# Patient Record
Sex: FEMALE | Race: BLACK OR AFRICAN AMERICAN | HISPANIC OR LATINO | Employment: FULL TIME | URBAN - METROPOLITAN AREA
[De-identification: names, ages, dates, MRNs, and addresses within clinical notes are randomized per-mention and may not be internally consistent; named-entity substitution may affect disease eponyms.]

---

## 2017-07-05 ENCOUNTER — HOSPITAL ENCOUNTER (EMERGENCY)
Facility: HOSPITAL | Age: 22
Discharge: HOME | End: 2017-07-05
Attending: EMERGENCY MEDICINE
Payer: COMMERCIAL

## 2017-07-05 VITALS
SYSTOLIC BLOOD PRESSURE: 140 MMHG | HEART RATE: 98 BPM | DIASTOLIC BLOOD PRESSURE: 82 MMHG | RESPIRATION RATE: 16 BRPM | HEIGHT: 65 IN | OXYGEN SATURATION: 100 % | TEMPERATURE: 98 F | WEIGHT: 150 LBS | BODY MASS INDEX: 24.99 KG/M2

## 2017-07-05 DIAGNOSIS — R50.9 ACUTE FEBRILE ILLNESS: Primary | ICD-10-CM

## 2017-07-05 LAB
ALBUMIN SERPL BCP-MCNC: 2.7 G/DL
ALP SERPL-CCNC: 123 U/L
ALT SERPL W/O P-5'-P-CCNC: 33 U/L
ANION GAP SERPL CALC-SCNC: 11 MMOL/L
AST SERPL-CCNC: 17 U/L
BACTERIA #/AREA URNS HPF: ABNORMAL /HPF
BASOPHILS # BLD AUTO: 0.01 K/UL
BASOPHILS NFR BLD: 0.1 %
BILIRUB SERPL-MCNC: 0.6 MG/DL
BILIRUB UR QL STRIP: NEGATIVE
BUN SERPL-MCNC: 16 MG/DL
CALCIUM SERPL-MCNC: 8.4 MG/DL
CHLORIDE SERPL-SCNC: 104 MMOL/L
CLARITY UR: CLEAR
CO2 SERPL-SCNC: 20 MMOL/L
COLOR UR: YELLOW
CREAT SERPL-MCNC: 0.9 MG/DL
DIFFERENTIAL METHOD: ABNORMAL
EOSINOPHIL # BLD AUTO: 0 K/UL
EOSINOPHIL NFR BLD: 0.1 %
ERYTHROCYTE [DISTWIDTH] IN BLOOD BY AUTOMATED COUNT: 13.8 %
EST. GFR  (AFRICAN AMERICAN): >60 ML/MIN/1.73 M^2
EST. GFR  (NON AFRICAN AMERICAN): >60 ML/MIN/1.73 M^2
GLUCOSE SERPL-MCNC: 90 MG/DL
GLUCOSE UR QL STRIP: NEGATIVE
HCG INTACT+B SERPL-ACNC: 21 MIU/ML
HCT VFR BLD AUTO: 34.5 %
HGB BLD-MCNC: 11.7 G/DL
HGB UR QL STRIP: ABNORMAL
HYALINE CASTS #/AREA URNS LPF: 0 /LPF
KETONES UR QL STRIP: ABNORMAL
LACTATE SERPL-SCNC: 0.6 MMOL/L
LEUKOCYTE ESTERASE UR QL STRIP: ABNORMAL
LYMPHOCYTES # BLD AUTO: 0.9 K/UL
LYMPHOCYTES NFR BLD: 8.1 %
MCH RBC QN AUTO: 30.2 PG
MCHC RBC AUTO-ENTMCNC: 33.9 %
MCV RBC AUTO: 89 FL
MICROSCOPIC COMMENT: ABNORMAL
MONOCYTES # BLD AUTO: 0.9 K/UL
MONOCYTES NFR BLD: 7.6 %
NEUTROPHILS # BLD AUTO: 9.8 K/UL
NEUTROPHILS NFR BLD: 84.1 %
NITRITE UR QL STRIP: NEGATIVE
NON-SQ EPI CELLS #/AREA URNS HPF: 3 /HPF
PH UR STRIP: 7 [PH] (ref 5–8)
PLATELET # BLD AUTO: 180 K/UL
PMV BLD AUTO: 11.7 FL
POTASSIUM SERPL-SCNC: 3.5 MMOL/L
PROT SERPL-MCNC: 6.7 G/DL
PROT UR QL STRIP: ABNORMAL
RBC # BLD AUTO: 3.88 M/UL
RBC #/AREA URNS HPF: 25 /HPF (ref 0–4)
SODIUM SERPL-SCNC: 135 MMOL/L
SP GR UR STRIP: 1.01 (ref 1–1.03)
SQUAMOUS #/AREA URNS HPF: 4 /HPF
URN SPEC COLLECT METH UR: ABNORMAL
UROBILINOGEN UR STRIP-ACNC: NEGATIVE EU/DL
WBC # BLD AUTO: 11.62 K/UL
WBC #/AREA URNS HPF: 4 /HPF (ref 0–5)

## 2017-07-05 PROCEDURE — 83605 ASSAY OF LACTIC ACID: CPT

## 2017-07-05 PROCEDURE — 81000 URINALYSIS NONAUTO W/SCOPE: CPT

## 2017-07-05 PROCEDURE — 96360 HYDRATION IV INFUSION INIT: CPT

## 2017-07-05 PROCEDURE — 84702 CHORIONIC GONADOTROPIN TEST: CPT

## 2017-07-05 PROCEDURE — 99284 EMERGENCY DEPT VISIT MOD MDM: CPT | Mod: 25

## 2017-07-05 PROCEDURE — 85025 COMPLETE CBC W/AUTO DIFF WBC: CPT

## 2017-07-05 PROCEDURE — 87040 BLOOD CULTURE FOR BACTERIA: CPT | Mod: 59

## 2017-07-05 PROCEDURE — 25000003 PHARM REV CODE 250: Performed by: EMERGENCY MEDICINE

## 2017-07-05 PROCEDURE — 96361 HYDRATE IV INFUSION ADD-ON: CPT

## 2017-07-05 PROCEDURE — 80053 COMPREHEN METABOLIC PANEL: CPT

## 2017-07-05 RX ORDER — SODIUM CHLORIDE 9 MG/ML
1000 INJECTION, SOLUTION INTRAVENOUS
Status: COMPLETED | OUTPATIENT
Start: 2017-07-05 | End: 2017-07-05

## 2017-07-05 RX ORDER — SODIUM CHLORIDE 9 MG/ML
500 INJECTION, SOLUTION INTRAVENOUS
Status: COMPLETED | OUTPATIENT
Start: 2017-07-05 | End: 2017-07-05

## 2017-07-05 RX ORDER — ACETAMINOPHEN 500 MG
1000 TABLET ORAL
Status: DISCONTINUED | OUTPATIENT
Start: 2017-07-05 | End: 2017-07-05

## 2017-07-05 RX ADMIN — SODIUM CHLORIDE 500 ML: 0.9 INJECTION, SOLUTION INTRAVENOUS at 07:07

## 2017-07-05 RX ADMIN — SODIUM CHLORIDE 1000 ML: 0.9 INJECTION, SOLUTION INTRAVENOUS at 05:07

## 2017-07-05 NOTE — ED TRIAGE NOTES
"Pt arrived via personal transportation from home. CC of abdominal pain. Pt stated "I have been having a fever since last night. I went to the urgent care and  They told me to come here." pt reports with generalized abdominal pain and fever. Pt also reports recent pregnancy. Pt denies N/V/D/SOB. NAD at this time.  "

## 2017-07-05 NOTE — ED PROVIDER NOTES
"Encounter Date: 7/5/2017    SCRIBE #1 NOTE: I, Kilojhonny Suazo II, am scribing for, and in the presence of,  Jose Pinto III, MD. I have scribed the following portions of the note - Other sections scribed: HPI and ROS.       History     Chief Complaint   Patient presents with    Abdominal Pain     " I gave birth 8 days ago (vaginal delivery) and last night I started having fever. I also have been having upper right abdominal pain."  Tylenol at 1600 for temp of 103.1      CC: Abdominal Pain     HPI: This 22 y.o. female with astham presents to the ED c/o acute onset, intermittent, upper right quadrant abdominal pain with fever that began today. Pt reports delivering a child via vaginal delivery 8 days ago. Pt states she experiences sharp shooting abdominal pain that lasts for 3 seconds intermittently. Pt states she has been experiencing decreased appetite today. Pt reports Tylenol given by the ED has made her feel better. Pt reports her OB/GYN is Dr. Sheridan Rich at [facility]. No alleviating or exacerbating factors. Pt denies nausea, vomiting, and dysuria.               The history is provided by the patient. No  was used.     Review of patient's allergies indicates:   Allergen Reactions    Fish containing products Hives    Eggs [egg derived] Hives    Latex, natural rubber Hives     Past Medical History:   Diagnosis Date    Asthma      History reviewed. No pertinent surgical history.  History reviewed. No pertinent family history.  Social History   Substance Use Topics    Smoking status: Never Smoker    Smokeless tobacco: Never Used    Alcohol use No     Review of Systems   Constitutional: Positive for appetite change (decreased) and fever (103 F). Negative for chills and diaphoresis.   HENT: Negative for ear pain and sore throat.    Eyes: Negative for pain.        (-) eye problems   Respiratory: Negative for cough and shortness of breath.    Cardiovascular: Negative for chest pain. "   Gastrointestinal: Positive for abdominal pain. Negative for diarrhea, nausea and vomiting.   Genitourinary: Negative for dysuria.   Musculoskeletal: Negative for back pain.        (-) arm or leg problems   Skin: Negative for rash.   Neurological: Negative for headaches.       Physical Exam     Initial Vitals [07/05/17 1623]   BP Pulse Resp Temp SpO2   113/75 (!) 136 20 (!) 102.7 °F (39.3 °C) 97 %      MAP       87.67         Physical Exam    Nursing note and vitals reviewed.  Constitutional: She appears well-developed and well-nourished. She is not diaphoretic. No distress.   HENT:   Head: Normocephalic and atraumatic.   Nose: Nose normal.   Mouth/Throat: Oropharynx is clear and moist. No oropharyngeal exudate.   Eyes: Conjunctivae and EOM are normal. Pupils are equal, round, and reactive to light. No scleral icterus.   Neck: Normal range of motion. Neck supple. No thyromegaly present. No tracheal deviation present.   Cardiovascular: Regular rhythm and normal heart sounds. Exam reveals no gallop and no friction rub.    No murmur heard.  tachy   Pulmonary/Chest: Breath sounds normal. No respiratory distress. She has no wheezes. She has no rhonchi. She has no rales.   Abdominal: Soft. Bowel sounds are normal. She exhibits no distension and no mass. There is tenderness (slight, suprapubic and RUQ). There is no rebound and no guarding.   Musculoskeletal: Normal range of motion. She exhibits no edema or tenderness.   Lymphadenopathy:     She has no cervical adenopathy.   Neurological: She is alert and oriented to person, place, and time. She has normal strength. No cranial nerve deficit or sensory deficit.   Skin: Skin is warm and dry. No rash noted. No erythema. No pallor.   Psychiatric: She has a normal mood and affect. Her behavior is normal. Thought content normal.         ED Course   Procedures  Labs Reviewed   CBC W/ AUTO DIFFERENTIAL - Abnormal; Notable for the following:        Result Value    RBC 3.88 (*)      Hemoglobin 11.7 (*)     Hematocrit 34.5 (*)     Gran # 9.8 (*)     Lymph # 0.9 (*)     Gran% 84.1 (*)     Lymph% 8.1 (*)     All other components within normal limits   CULTURE, BLOOD   CULTURE, BLOOD   COMPREHENSIVE METABOLIC PANEL   URINALYSIS   LACTIC ACID, PLASMA   HCG, QUANTITATIVE, PREGNANCY             Medical Decision Making:   Initial Assessment:   22-year-old female who is 8 days status post  presents complaining of fever and chills along with very brief shooting right upper quadrant pains, lasting only a few seconds, and not associated with significant nausea, vomiting, cough, chest pain, shortness of breath.  Physical examination reveals sinus tachycardia, normal oxygenation, normal work of breathing, clear lungs, very slight abdominal tenderness, no peripheral edema, no signs of DVT.    I have performed a bedside ultrasound with good visualization of the gallbladder, and there is no cholelithiasis, gallbladder wall thickening, pericholecystic fluid, or CBD dilatation.   Differential Diagnosis:   Will screen for urinary tract infection, pneumonia, dehydration.  Description of symptoms and abdominal exam not consistent with surgical abdominal pathology, such as cholecystitis, pancreatitis, appendicitis, perforated viscus.  No significant pelvic tenderness or vaginal discharge to suggest endometritis.  Independently Interpreted Test(s):   I have ordered and independently interpreted X-rays - see summary below.       <> Summary of X-Ray Reading(s): Chest x-ray: No acute abnormality  ED Management:  Patient's workup does not reveal any concerning abnormalities, including no leukocytosis, no evidence of urinary tract infection.  Additionally there is no suggestion of preeclampsia or HELLP.  I have discussed this case with Dr. Loyd, was on-call for the patient's OB/GYN, and agrees that patient's presentation is not consistent with endometritis.  He does recommend evaluation of patient's breast, stating  that engorgement can cause fever.  Patient explicitly denies breast pain, swelling, color change of the skin, though she does admit that she does not pump often she has some generalized breast tenderness.    Patient counseled regarding test results, recommendations for supportive care, and need for follow-up.  Have discussed the patient's unclear source of fever with her, including explicit discussion of return precautions.               Scribe Attestation:   Scribe #1: I performed the above scribed service and the documentation accurately describes the services I performed. I attest to the accuracy of the note.    Attending Attestation:           Physician Attestation for Scribe:  Physician Attestation Statement for Scribe #1: I, Jose Pinto III, MD, reviewed documentation, as scribed by Kilo Suazo II in my presence, and it is both accurate and complete.                 ED Course     Clinical Impression:   The encounter diagnosis was Acute febrile illness.                           Jose Pinto III, MD  07/05/17 5734

## 2017-07-06 NOTE — DISCHARGE INSTRUCTIONS
As discussed, you should return to the emergency department if you develop persistent vomiting, worsening abdominal pain, vaginal discharge, fever higher than 104°, or for any new or worsening medical concerns.     As discussed, make sure you are breast-feeding or pumping frequently because breast engorgement can cause fever.

## 2017-07-06 NOTE — ED NOTES
Report received from RAYMON Tao. Pt lying in bed. Friend at bedside. Bed lowered. Side rails up. Will continue to monitor.

## 2017-07-10 LAB
BACTERIA BLD CULT: NORMAL
BACTERIA BLD CULT: NORMAL

## 2021-05-13 ENCOUNTER — HISTORICAL (OUTPATIENT)
Dept: ADMINISTRATIVE | Facility: HOSPITAL | Age: 26
End: 2021-05-13

## 2021-05-13 LAB — FETAL FIBRONECTIN (OHS): NEGATIVE
